# Patient Record
Sex: MALE | Race: WHITE | Employment: FULL TIME | URBAN - METROPOLITAN AREA
[De-identification: names, ages, dates, MRNs, and addresses within clinical notes are randomized per-mention and may not be internally consistent; named-entity substitution may affect disease eponyms.]

---

## 2019-03-13 PROBLEM — S86.119A GASTROCNEMIUS STRAIN: Status: ACTIVE | Noted: 2019-03-13

## 2019-04-10 PROBLEM — M25.562 POSTERIOR KNEE PAIN, LEFT: Status: ACTIVE | Noted: 2019-04-10

## 2019-04-30 PROBLEM — S83.242D ACUTE MEDIAL MENISCAL TEAR, LEFT, SUBSEQUENT ENCOUNTER: Status: ACTIVE | Noted: 2019-04-30

## 2019-04-30 PROBLEM — M17.12 PRIMARY OSTEOARTHRITIS OF LEFT KNEE: Status: ACTIVE | Noted: 2019-04-30

## 2021-04-17 ENCOUNTER — HOSPITAL ENCOUNTER (OUTPATIENT)
Age: 51
Discharge: HOME | End: 2021-04-17
Payer: COMMERCIAL

## 2021-04-17 VITALS
SYSTOLIC BLOOD PRESSURE: 114 MMHG | WEIGHT: 182 LBS | OXYGEN SATURATION: 96 % | DIASTOLIC BLOOD PRESSURE: 92 MMHG | TEMPERATURE: 99 F | BODY MASS INDEX: 25.48 KG/M2 | HEART RATE: 68 BPM | HEIGHT: 71 IN | RESPIRATION RATE: 12 BRPM

## 2021-04-17 DIAGNOSIS — B34.9 VIRAL ILLNESS: ICD-10-CM

## 2021-04-17 DIAGNOSIS — R50.9 FEVER, UNSPECIFIED FEVER CAUSE: Primary | ICD-10-CM

## 2021-04-17 PROCEDURE — U0002 COVID-19 LAB TEST NON-CDC: HCPCS | Performed by: NURSE PRACTITIONER

## 2021-04-17 PROCEDURE — 99202 OFFICE O/P NEW SF 15 MIN: CPT | Performed by: NURSE PRACTITIONER

## 2021-04-17 PROCEDURE — 81002 URINALYSIS NONAUTO W/O SCOPE: CPT | Performed by: NURSE PRACTITIONER

## 2021-04-17 PROCEDURE — A9150 MISC/EXPER NON-PRESCRIPT DRU: HCPCS | Performed by: NURSE PRACTITIONER

## 2021-04-17 RX ORDER — ACETAMINOPHEN 500 MG
1000 TABLET ORAL ONCE
Status: COMPLETED | OUTPATIENT
Start: 2021-04-17 | End: 2021-04-17

## 2021-04-17 NOTE — ED INITIAL ASSESSMENT (HPI)
C/o headache, fever, fatigue this week. Did have first Covid vaccine 2 weeks ago. Hx of Covid one year ago.

## 2021-04-17 NOTE — ED PROVIDER NOTES
Patient Seen in: [facility] [address] [place] [address]      History   Patient presents with:  Fever: Feverish, headache, fatigue and weakness for 5+ days.  My wife is making me get check out. - Entered by patient    Stated Complaint: Fever - Feverish, headache, stated complaint: Fever - Feverish, headache, fatigue and weakness for 5+ days. My wife is making*  Other systems are as noted in HPI. Constitutional and vital signs reviewed. All other systems reviewed and negative except as noted above.     Physical rhonchi or rales. Chest:      Chest wall: No tenderness. Abdominal:      General: There is no distension. Tenderness: There is no abdominal tenderness.    Musculoskeletal:      Cervical back: Full passive range of motion without pain, normal range

## 2022-06-11 ENCOUNTER — HOSPITAL ENCOUNTER (OUTPATIENT)
Age: 52
Discharge: HOME | End: 2022-06-11
Payer: COMMERCIAL

## 2022-06-11 VITALS
WEIGHT: 190 LBS | DIASTOLIC BLOOD PRESSURE: 87 MMHG | SYSTOLIC BLOOD PRESSURE: 131 MMHG | RESPIRATION RATE: 14 BRPM | HEART RATE: 68 BPM | HEIGHT: 71 IN | BODY MASS INDEX: 26.6 KG/M2 | OXYGEN SATURATION: 97 % | TEMPERATURE: 98 F

## 2022-06-11 DIAGNOSIS — L20.9 ATOPIC DERMATITIS, UNSPECIFIED TYPE: Primary | ICD-10-CM

## 2022-06-11 PROCEDURE — 99213 OFFICE O/P EST LOW 20 MIN: CPT | Performed by: PHYSICIAN ASSISTANT

## 2022-06-11 RX ORDER — PREDNISONE 10 MG/1
TABLET ORAL
Qty: 30 TABLET | Refills: 0 | Status: SHIPPED | OUTPATIENT
Start: 2022-06-11

## 2022-06-11 NOTE — ED INITIAL ASSESSMENT (HPI)
Pt has had a rash to his left calf that itches for 4 weeks and now the skin is getting worse because he is itching it, and it started to spread

## 2023-12-13 ENCOUNTER — HOSPITAL ENCOUNTER (OUTPATIENT)
Age: 53
Discharge: HOME | End: 2023-12-13
Payer: COMMERCIAL

## 2023-12-13 VITALS
HEART RATE: 84 BPM | SYSTOLIC BLOOD PRESSURE: 132 MMHG | HEIGHT: 71.5 IN | BODY MASS INDEX: 25.33 KG/M2 | OXYGEN SATURATION: 96 % | WEIGHT: 185 LBS | DIASTOLIC BLOOD PRESSURE: 91 MMHG | RESPIRATION RATE: 16 BRPM | TEMPERATURE: 98 F

## 2023-12-13 DIAGNOSIS — J11.1 INFLUENZA: Primary | ICD-10-CM

## 2023-12-13 DIAGNOSIS — R05.9 COUGH: ICD-10-CM

## 2023-12-13 LAB
POCT INFLUENZA A: POSITIVE
POCT INFLUENZA B: NEGATIVE

## 2023-12-13 PROCEDURE — 99213 OFFICE O/P EST LOW 20 MIN: CPT | Performed by: PHYSICIAN ASSISTANT

## 2023-12-13 PROCEDURE — 87502 INFLUENZA DNA AMP PROBE: CPT | Performed by: PHYSICIAN ASSISTANT

## 2023-12-13 NOTE — DISCHARGE INSTRUCTIONS
Increase fluids and rest  Ceiba diet-bananas rest applesauce toast  Follow-up with primary care doctor in 48 hours  Return to the ER symptoms worsen

## 2023-12-13 NOTE — ED INITIAL ASSESSMENT (HPI)
Recent travel from [place], retruned to \[facility]"" on Sunday  Having fever  Coughing   Diarrhea  Decreased appetite  Abdominal pain last night but has resolved  Fatigued

## 2024-03-18 ENCOUNTER — NURSE ONLY (OUTPATIENT)
Dept: ALLERGY | Facility: CLINIC | Age: 54
End: 2024-03-18

## 2024-03-18 ENCOUNTER — TELEPHONE (OUTPATIENT)
Dept: ALLERGY | Facility: CLINIC | Age: 54
End: 2024-03-18

## 2024-03-18 ENCOUNTER — OFFICE VISIT (OUTPATIENT)
Dept: ALLERGY | Facility: CLINIC | Age: 54
End: 2024-03-18

## 2024-03-18 VITALS
TEMPERATURE: 98 F | OXYGEN SATURATION: 98 % | HEART RATE: 72 BPM | DIASTOLIC BLOOD PRESSURE: 74 MMHG | HEIGHT: 71 IN | SYSTOLIC BLOOD PRESSURE: 133 MMHG | WEIGHT: 185 LBS | BODY MASS INDEX: 25.9 KG/M2 | RESPIRATION RATE: 16 BRPM

## 2024-03-18 DIAGNOSIS — L20.9 ATOPIC DERMATITIS, UNSPECIFIED TYPE: Primary | ICD-10-CM

## 2024-03-18 DIAGNOSIS — Z23 FLU VACCINE NEED: ICD-10-CM

## 2024-03-18 DIAGNOSIS — L20.89 OTHER ATOPIC DERMATITIS: Primary | ICD-10-CM

## 2024-03-18 DIAGNOSIS — Z88.0 PENICILLIN ALLERGY: ICD-10-CM

## 2024-03-18 DIAGNOSIS — Z91.09 ENVIRONMENTAL ALLERGIES: ICD-10-CM

## 2024-03-18 DIAGNOSIS — Z23 NEED FOR COVID-19 VACCINE: ICD-10-CM

## 2024-03-18 PROCEDURE — 3075F SYST BP GE 130 - 139MM HG: CPT | Performed by: ALLERGY & IMMUNOLOGY

## 2024-03-18 PROCEDURE — 99204 OFFICE O/P NEW MOD 45 MIN: CPT | Performed by: ALLERGY & IMMUNOLOGY

## 2024-03-18 PROCEDURE — 3008F BODY MASS INDEX DOCD: CPT | Performed by: ALLERGY & IMMUNOLOGY

## 2024-03-18 PROCEDURE — 95024 IQ TESTS W/ALLERGENIC XTRCS: CPT | Performed by: ALLERGY & IMMUNOLOGY

## 2024-03-18 PROCEDURE — 95004 PERQ TESTS W/ALRGNC XTRCS: CPT | Performed by: ALLERGY & IMMUNOLOGY

## 2024-03-18 PROCEDURE — 3078F DIAST BP <80 MM HG: CPT | Performed by: ALLERGY & IMMUNOLOGY

## 2024-03-18 RX ORDER — PREDNISONE 10 MG/1
TABLET ORAL
Qty: 38 TABLET | Refills: 0 | Status: SHIPPED | OUTPATIENT
Start: 2024-03-18

## 2024-03-18 RX ORDER — DOXYCYCLINE HYCLATE 100 MG/1
100 CAPSULE ORAL 2 TIMES DAILY
Qty: 20 CAPSULE | Refills: 0 | Status: SHIPPED | OUTPATIENT
Start: 2024-03-18 | End: 2024-03-28

## 2024-03-18 RX ORDER — TRIAMCINOLONE ACETONIDE 1 MG/G
OINTMENT TOPICAL
Qty: 453 G | Refills: 0 | Status: SHIPPED | OUTPATIENT
Start: 2024-03-18

## 2024-03-18 NOTE — TELEPHONE ENCOUNTER
Patient calling back in regards to appointment at 3:30 pm today 3/18/24    Per patient has [employer] PPO insurance, will bring information and insurance card to his appointment  Patient also sees Dr. Treviño [email or web address]     RN advised to please come 15 minutes earlier to ensure that insurance is entered in properly and he is checked in on time before appointment     Routed to Dr. Franco as TANMAY

## 2024-03-18 NOTE — PATIENT INSTRUCTIONS
#1 atopic dermatitis  Moderate   Prior Derm evaluation 1.5 years ago in [place].  No records.  Patient reports prior punch biopsy by dermatology was positive for eczema.  Previously treated with topical steroids as well as a course of oral steroids back then with improvement.  Patient with recent flare over the past 2 weeks.  Patient looking for relief no prior Dupixent or allergy testing    Handouts on atopic dermatitis provided and reviewed  Start triamcinolone 0.1% ointment twice a day.  Patient to contact my office with previous medications tried in the past  Start prednisone 40 mg once a day with food x 5 days then taper by 10 mg every 3 days for a total of 2 weeks.  Reviewed prednisone as a short-term treatment and not a cure for eczema  Dove is a soap or Cetaphil as a cleanser  Reviewed to follow triamcinolone topically with a topical moisturizer including Cetaphil CeraVe Vanicream or Aquaphor  Reviewed Dupixent as a potential treatment option if refractory    #2 Food allergy  Patient reports concern for food triggers for eczema.  Currently avoiding gluten.  Handouts on  atopic dermatitis and food allergies provided and reviewed      #3 flu vaccine recommended in the fall    #4 COVID-vaccine booster recommended    #5 penicillin allergy.  Prior reaction less than 1 year of age.  Patient defers serum IgE testing to penicillin at this time.  Reviewed 80% chance of outgrowing it on allergy within 10 years the previous episode.  Patient to contact my office if interested in proceeding in the future

## 2024-03-18 NOTE — TELEPHONE ENCOUNTER
Left a message for patient to please contact our office to verify PCP and insurance before appointment today ,3/18/24 at 3:30 pm.

## 2024-03-18 NOTE — TELEPHONE ENCOUNTER
Please call to verify insurance.  Patient made appointment today for a 3:30 PM appointment today.  PCP is not at [facility].

## 2024-03-18 NOTE — PROGRESS NOTES
Anup Victoria is a 54 year old male.    HPI:     Chief Complaint   Patient presents with    Eczema     Pt presents with Eczema x 1.5 years. Unsure of initial cause. Pt reports worse to hands - continuous washing of hands. Also affecting legs and back. Has used topical cream in past, has seen a Dermatologist.      Patient is a 54-year-old male who presents for allergy evaluation with a chief complaint of eczema.  No vaccines on record    Today patient reports    Allergy/eczema   duration:1.5 years ago   Timing:persistent   Symptoms: eczema like rash : red dry scaly   Location:  left lower leg then both legs, arms back  Saw derm : + punch Bx + for ad, pr ed , ts   Severity: moderate   Status: worsening   Triggers:??   Tried: topical, pred,   Pets or smokers: 1 dog x 4 months  Nonsmoker   Benadryl 2 days ago     Hx of asthma, ar, or food allergy:  denies     Wife in RN in [place]     Sees derm in [place] , last 1.5 years ago   Avoids gluten , etoh,   No prior  dupixant     Looking for relief     Pcn allergy   1 yo     Works as a [employer]  in [employer] until [employer] and eggs        HISTORY:  History reviewed. No pertinent past medical history.   Past Surgical History:   Procedure Laterality Date    KNEE ARTHROSCOPY  2012    right knee       History reviewed. No pertinent family history.   Social History:   Social History     Socioeconomic History    Marital status:    Tobacco Use    Smoking status: Never     Passive exposure: Never    Smokeless tobacco: Never   Vaping Use    Vaping Use: Never used   Substance and Sexual Activity    Alcohol use: Yes     Comment: rarely,    Drug use: Never        Medications (Active prior to today's visit):  Current Outpatient Medications   Medication Sig Dispense Refill    predniSONE 10 MG Oral Tab Take 40mg q day x 5 days,then 30mg x 3 days,then 20 mg x 3 days,then10 mg x 3 days with food 38 tablet 0    triamcinolone 0.1 % External Ointment Applied 2 times per day as  needed 453 g 0    doxycycline 100 MG Oral Cap Take 1 capsule (100 mg total) by mouth 2 (two) times daily for 10 days. 20 capsule 0    PREDNISONE 10 MG Oral Tab Take 50mg x 2 days, take 40mg x 2 days, take 30mg x 2days, take 20 mg x 2 days, take 10mg x 2 days (Patient not taking: Reported on 3/18/2024) 30 tablet 0    triamcinolone 0.1 % External Cream Apply topically 2 (two) times daily. (Patient not taking: Reported on 3/18/2024) 1 each 1       Allergies:  Allergies   Allergen Reactions    Penicillins ANAPHYLAXIS         ROS:     Allergic/Immuno:  See HPI  Cardiovascular:  Negative for irregular heartbeat/palpitations, chest pain, edema  Constitutional:  Negative night sweats,weight loss, irritability and lethargy  Endocrine:  Negative for cold intolerance, polydipsia and polyphagia  ENMT:  Negative for ear drainage, hearing loss and nasal drainage  Eyes:  Negative for eye discharge and vision loss  Gastrointestinal:  Negative for abdominal pain, diarrhea and vomiting  Genitourinary:  Negative for dysuria and hematuria  Hema/Lymph:  Negative for easy bleeding and easy bruising  Integumentary: see hpi   Musculoskeletal:  Negative for joint symptoms  Neurological:  Negative for dizziness, seizures  Psychiatric:  Negative for inappropriate interaction and psychiatric symptoms  Respiratory:  Negative for cough, dyspnea and wheezing      PHYSICAL EXAM:   Constitutional: responsive, no acute distress noted  Head/Face: NC/Atraumatic  Eyes/Vision: conjunctiva and lids are normal extraocular motion is intact   Ears/Audiometry: tympanic membranes are normal bilaterally hearing is grossly intact  Nose/Mouth/Throat: nose and throat are clear mucous membranes are moist   Neck/Thyroid: neck is supple without adenopathy  Lymphatic: no abnormal cervical, supraclavicular or axillary adenopathy is noted  Respiratory: normal to inspection lungs are clear to auscultation bilaterally normal respiratory effort   Cardiovascular: regular  rate and rhythm no murmurs, gallups, or rubs  Abdomen: soft non-tender non-distended  Skin/Hair: Dry red scaly eczematous patches with lichenification on their surface of both forearms right side more prominent than left.  Some impetiginization as well on right side.  Posterior back with scattered discoid red scaly areas of eczema  Extremities: no edema, cyanosis, or clubbing  Neurological:Oriented to time, place, person & situation       ASSESSMENT/PLAN:   Assessment   Encounter Diagnoses   Name Primary?    Other atopic dermatitis Yes    Flu vaccine need     Need for COVID-19 vaccine     Environmental allergies     Penicillin allergy      Skin testing today to common indoor and outdoor environmental allergies was neagtive     Skin testing today to common food allergens including milk egg wheat soy almond walnut peanut was negative.    #1 atopic dermatitis  Prior Derm evaluation 1.5 years ago in [place].  No records.  Patient reports prior punch biopsy by dermatology was positive for eczema.  Previously treated with topical steroids as well as a course of oral steroids back then with improvement.  Patient with recent flare over the past 2 weeks.  Patient looking for relief no prior Dupixent or allergy testing    Handouts on atopic dermatitis provided and reviewed  Start triamcinolone 0.1% ointment twice a day.  Patient to contact my office with previous medications tried in the past  Start prednisone 40 mg once a day with food x 5 days then taper by 10 mg every 3 days for a total of 2 weeks.  Reviewed prednisone as a short-term treatment and not a cure for eczema  Dove is a soap or Cetaphil as a cleanser  Reviewed to follow triamcinolone topically with a topical moisturizer including Cetaphil CeraVe Vanicream or Aquaphor  Start doxycycline 100 mg twice a day for 10 days due to concern for secondary infection and impinging his agent  Reviewed Dupixent as a potential treatment option if refractory    #2 Food  allergy  Patient reports concern for food triggers for eczema.  Currently avoiding gluten.  Handouts on  atopic dermatitis and food allergies provided and reviewed      #3 flu vaccine recommended in the fall    #4 COVID-vaccine booster recommended    #5 penicillin allergy.  Prior reaction less than 1 year of age.  Patient defers serum IgE testing to penicillin at this time.  Reviewed 80% chance of outgrowing it on allergy within 10 years the previous episode.  Patient to contact my office if interested in proceeding in the future             Orders This Visit:  No orders of the defined types were placed in this encounter.      Meds This Visit:  Requested Prescriptions     Signed Prescriptions Disp Refills    predniSONE 10 MG Oral Tab 38 tablet 0     Sig: Take 40mg q day x 5 days,then 30mg x 3 days,then 20 mg x 3 days,then10 mg x 3 days with food    triamcinolone 0.1 % External Ointment 453 g 0     Sig: Applied 2 times per day as needed    doxycycline 100 MG Oral Cap 20 capsule 0     Sig: Take 1 capsule (100 mg total) by mouth 2 (two) times daily for 10 days.       Imaging & Referrals:  None     3/18/2024  Noam Franco MD      If medication samples were provided today, they were provided solely for patient education and training related to self administration of these medications.  Teaching, instruction and sample was provided to the patient by myself.  Teaching included  a review of potential adverse side effects as well as potential efficacy.  Patient's questions were answered in regards to medication administration and dosing and potential side effects. Teaching was provided via the teach back method